# Patient Record
Sex: FEMALE | Race: WHITE | NOT HISPANIC OR LATINO | Employment: FULL TIME | URBAN - METROPOLITAN AREA
[De-identification: names, ages, dates, MRNs, and addresses within clinical notes are randomized per-mention and may not be internally consistent; named-entity substitution may affect disease eponyms.]

---

## 2017-04-11 ENCOUNTER — TRANSCRIBE ORDERS (OUTPATIENT)
Dept: ADMINISTRATIVE | Facility: HOSPITAL | Age: 57
End: 2017-04-11

## 2017-04-11 DIAGNOSIS — R31.0 GROSS HEMATURIA: Primary | ICD-10-CM

## 2018-06-19 ENCOUNTER — OFFICE VISIT (OUTPATIENT)
Dept: ORTHOPEDIC SURGERY | Facility: CLINIC | Age: 58
End: 2018-06-19

## 2018-06-19 VITALS — BODY MASS INDEX: 28.48 KG/M2 | TEMPERATURE: 98.6 F | HEIGHT: 64 IN | WEIGHT: 166.8 LBS

## 2018-06-19 DIAGNOSIS — Z96.642 HISTORY OF TOTAL LEFT HIP REPLACEMENT: Primary | ICD-10-CM

## 2018-06-19 DIAGNOSIS — Z96.642 STATUS POST LEFT HIP REPLACEMENT: ICD-10-CM

## 2018-06-19 PROCEDURE — 99203 OFFICE O/P NEW LOW 30 MIN: CPT | Performed by: ORTHOPAEDIC SURGERY

## 2018-06-19 PROCEDURE — 73502 X-RAY EXAM HIP UNI 2-3 VIEWS: CPT | Performed by: ORTHOPAEDIC SURGERY

## 2018-06-19 NOTE — PROGRESS NOTES
"Patient: Kiarra Rodriguez  YOB: 1960 58 y.o. female  Medical Record Number: [id number]    Chief Complaints:   Chief Complaint   Patient presents with   • Left Hip - Establish Care, Pain       History of Present Illness:Kiarra Rodriguez is a 58 y.o. female who presents with  Left hip pain.  She has an intermittent nerve type pain worse when bending at the waist it is mild-to-moderate in nature.  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  Dr. Peralta's revised her acetabular component with a reconstruction cage.  That was done 18 years ago.  She has done well until recently when she began to develop the symptoms.  She does have some issues with her low back as well.    Allergies:   Allergies   Allergen Reactions   • Demerol [Meperidine]    • Eggs Or Egg-Derived Products GI Intolerance   • Wheat Bran GI Intolerance       Medications:   Current Outpatient Prescriptions   Medication Sig Dispense Refill   • DIGESTIVE ENZYMES PO Take  by mouth.     • MAGNESIUM PO Take 30 mg by mouth.     • Potassium 75 MG tablet Take  by mouth.     • vitamin d (CHOLECALIFEROL) 5000 units capsule Take  by mouth.       No current facility-administered medications for this visit.          The following portions of the patient's history were reviewed and updated as appropriate: allergies, current medications, past family history, past medical history, past social history, past surgical history and problem list.    Review of Systems:   A 14 point review of systems was performed. All systems negative except pertinent positives/negative listed in HPI above    Physical Exam:   Vitals:    06/19/18 1540   Temp: 98.6 °F (37 °C)   Weight: 75.7 kg (166 lb 12.8 oz)   Height: 162.6 cm (64\")   PainSc: 0-No pain       General: A and O x 3, ASA, NAD    SCLERA:    Normal    DENTITION:   Normal  Hip:  left    LEG ALIGNMENT:     Neutral   ,    equal leg lengths    GAIT:     " Nonantalgic    SKIN:     No abnormality, well heladed incision    RANGE OF MOTION:      Full without joint irritability    STRENGTH:     5 / 5    hip flexion and abduction    DISTAL PULSES:    Paplable    DISTAL SENSATION :   Intact    LYMPHATICS:     No   lymphadenopathy    OTHER:          - Negative Stinchfeld test      - Negative log roll      - No Tenderness to palpation trochanteric bursa      - Neg FADIR      - Neg JOHNY      - No SI tenderness        Radiology:  Xrays 2views LEFT HIP (AP bilateral hips, and lateral of the hip) ordered and reviewed for evaluation of hip pain  demonstrating  a well positioned revision total hip without evidence of wear, loosening, or osteoarthritis.  There is a reconstruction cage which is cemented in.  It shows no signs of loosening I compared them to previous outside films dating back to 2006 and there has been no change    Assessment/Plan: Left hip pain which is a nerve-type pain which may be related to low back.  I see no signs of loosening or significant wear of her revision hip prosthesis.  I'd like to check her back in 6 months sooner should she have worsening pain.  We will repeat x-rays at that time.      Yefri Burger MD  6/19/2018

## 2019-01-29 ENCOUNTER — OFFICE VISIT (OUTPATIENT)
Dept: ORTHOPEDIC SURGERY | Facility: CLINIC | Age: 59
End: 2019-01-29

## 2019-01-29 VITALS — TEMPERATURE: 98.3 F | WEIGHT: 169 LBS | BODY MASS INDEX: 28.85 KG/M2 | HEIGHT: 64 IN

## 2019-01-29 DIAGNOSIS — Z96.642 STATUS POST LEFT HIP REPLACEMENT: Primary | ICD-10-CM

## 2019-01-29 PROCEDURE — 73502 X-RAY EXAM HIP UNI 2-3 VIEWS: CPT | Performed by: ORTHOPAEDIC SURGERY

## 2019-01-29 PROCEDURE — 99212 OFFICE O/P EST SF 10 MIN: CPT | Performed by: ORTHOPAEDIC SURGERY

## 2019-01-29 NOTE — PROGRESS NOTES
"Patient: Kiarra Rodriguez  YOB: 1960 58 y.o. female  Medical Record Number: [id number]    Chief Complaint:   Chief Complaint   Patient presents with   • Left Hip - Follow-up       History of Present Illness:Kiarra Rodriguez is a 58 y.o. female who presents for follow-up of  left total hip.  Surgery done nearly 19 years ago by Dr. Bustillo.  She had a complex acetabular reconstruction with cage.  She had some soreness when I last saw her about 6 months ago but that is improved quite a bit.  She feels it was nerve related to her low back.  At this point she denies any groin pain is getting around recently well things considered.    Allergies:   Allergies   Allergen Reactions   • Demerol [Meperidine]    • Eggs Or Egg-Derived Products GI Intolerance and Nausea And Vomiting   • Wheat Bran GI Intolerance       Medications:   Current Outpatient Medications   Medication Sig Dispense Refill   • DIGESTIVE ENZYMES PO Take  by mouth.     • MAGNESIUM PO Take 30 mg by mouth.     • Potassium 75 MG tablet Take  by mouth.     • vitamin d (CHOLECALIFEROL) 5000 units capsule Take  by mouth.       No current facility-administered medications for this visit.          The following portions of the patient's history were reviewed and updated as appropriate: allergies, current medications, past family history, past medical history, past social history, past surgical history and problem list.    Review of Systems:   A 14 point review of systems was performed. All systems negative except pertinent positives/negative listed in HPI above    Physical Exam:   Vitals:    01/29/19 1503   Temp: 98.3 °F (36.8 °C)   TempSrc: Temporal   Weight: 76.7 kg (169 lb)   Height: 162.6 cm (64\")       General: A and O x 3, ASA, NAD    SCLERA:    Normal    DENTITION:   Normal  Mild weakness of the left hip flexor and abductor muscles incisions well-healed she has overall good range of motion somewhat tight in flexion.  She walks with slight antalgic " gait leg lengths are equal    Radiology:    Xrays 2views left hip (AP bilateral hips and lateral hip) were ordered and reviewed for evaluation of hip pain demonstrating a well positioned total hip with acetabular cage reconstruction without evidence of wear, loosening, or osteoarthritis  Comparison views: todays xrays were compared to previous xrays and demonstrate no change    Assessment/Plan:  Left complex hip acetabular reconstruction 19 years out doing well no signs of wear or loosening or ostial lysis.  She'll return in 1 year with repeat x-rays sooner should she have change in symptoms.      Yefri Burger MD  1/29/2019

## 2022-01-28 ENCOUNTER — OFFICE VISIT (OUTPATIENT)
Dept: ORTHOPEDIC SURGERY | Facility: CLINIC | Age: 62
End: 2022-01-28

## 2022-01-28 DIAGNOSIS — R52 PAIN: Primary | ICD-10-CM

## 2022-01-28 DIAGNOSIS — M54.40 LOW BACK PAIN WITH SCIATICA, SCIATICA LATERALITY UNSPECIFIED, UNSPECIFIED BACK PAIN LATERALITY, UNSPECIFIED CHRONICITY: ICD-10-CM

## 2022-01-28 PROCEDURE — 99213 OFFICE O/P EST LOW 20 MIN: CPT | Performed by: NURSE PRACTITIONER

## 2022-01-28 PROCEDURE — 73502 X-RAY EXAM HIP UNI 2-3 VIEWS: CPT | Performed by: NURSE PRACTITIONER

## 2022-01-28 NOTE — PROGRESS NOTES
Patient: Kiarra Rodriguez  YOB: 1960 61 y.o. female  Medical Record Number: [id number]    Chief Complaints: Left hip pain    History of Present Illness:Kiarra Rodriguez is a 61 y.o. female who presents with complaints of left posterior hip and back pain.  The patient has had multiple surgeries to her left hip a complex acetabular reconstruction with cage.  She denies any groin pain.  The majority the pain is in her left lower back and she describes it as a mild to moderate occasional ache.  It is not daily.  She denies any radicular symptoms.  Denies any injury.  Denies any fever or chills.    Allergies:   Allergies   Allergen Reactions   • Demerol [Meperidine]    • Eggs Or Egg-Derived Products GI Intolerance and Nausea And Vomiting   • Wheat Bran GI Intolerance       Medications:   Current Outpatient Medications   Medication Sig Dispense Refill   • DIGESTIVE ENZYMES PO Take  by mouth.     • MAGNESIUM PO Take 30 mg by mouth.     • Potassium 75 MG tablet Take  by mouth.     • vitamin d (CHOLECALIFEROL) 5000 units capsule Take  by mouth.       No current facility-administered medications for this visit.         The following portions of the patient's history were reviewed and updated as appropriate: allergies, current medications, past family history, past medical history, past social history, past surgical history and problem list.    Review of Systems:   A 14 point review of systems was performed. All systems negative except pertinent positives/negative listed in HPI above    Physical Exam:   Temperature is afebrile, respirations 20    General: A and O x 3, ASA, NAD    SCLERA:    Normal    Skin clear no unusual lesions noted left hip patient is nontender palpation she has excellent range of motion with no instability calf is soft and nontender she does however have decreased range of motion of lower back with a positive left straight leg raise      Radiology:  Xrays 2 views of left hip ordered and  reviewed today secondary to pain and show a right total hip replacement with right acetabular reconstruction.  Compared to views are unchanged.  She does however have significant arthritic changes noted lower lumbar spine which is only able to be partially visualized on hip x-ray    Assessment/Plan: Low back pain    I think that the majority the patient's pain is actually coming from her lower back rather than her hip.  She and I discussed options and we will start with physical therapy and Tylenol as needed.  She will call me back if her symptoms do not improve we will consider doing an MRI of lumbar spine.  She will follow-up with Dr. Burger in 1 year for her hip      Velvet Briseno, APRN  1/28/2022

## 2024-02-23 ENCOUNTER — OFFICE VISIT (OUTPATIENT)
Dept: ORTHOPEDIC SURGERY | Facility: CLINIC | Age: 64
End: 2024-02-23
Payer: COMMERCIAL

## 2024-02-23 VITALS — WEIGHT: 161 LBS | TEMPERATURE: 98.9 F | BODY MASS INDEX: 27.49 KG/M2 | HEIGHT: 64 IN

## 2024-02-23 DIAGNOSIS — R52 PAIN: Primary | ICD-10-CM

## 2024-02-23 DIAGNOSIS — Z98.890 HISTORY OF HIP SURGERY: ICD-10-CM

## 2024-02-23 PROCEDURE — 99213 OFFICE O/P EST LOW 20 MIN: CPT | Performed by: NURSE PRACTITIONER

## 2024-02-23 NOTE — PROGRESS NOTES
"Patient: Kiarra Rodriguez  YOB: 1960 63 y.o. female  Medical Record Number: [id number]    Chief Complaints:   Chief Complaint   Patient presents with    Left Hip - Follow-up       History of Present Illness:Kiarra Rodriguez is a 63 y.o. female who presents for follow-up for left total hip replacement that was done by Dr. Bustillo approximately 23 years ago with a large acetabular reconstruction.  Continues to do well, denies any problems, she has occasional mild soreness not only in the hip but also lower back but denies any specific pain or problems.    Allergies:   Allergies   Allergen Reactions    Demerol [Meperidine]     Eggs Or Egg-Derived Products GI Intolerance and Nausea And Vomiting    Wheat Bran GI Intolerance       Medications:   Current Outpatient Medications   Medication Sig Dispense Refill    DIGESTIVE ENZYMES PO Take  by mouth.      MAGNESIUM PO Take 30 mg by mouth.      Potassium 75 MG tablet Take  by mouth.      vitamin d (CHOLECALIFEROL) 5000 units capsule Take  by mouth.       No current facility-administered medications for this visit.         The following portions of the patient's history were reviewed and updated as appropriate: allergies, current medications, past family history, past medical history, past social history, past surgical history and problem list.    Review of Systems:   14 point review of systems was performed. All systems negative except pertinent positives/negatives listed in HPI above    Physical Exam:   Vitals:    02/23/24 1350   Temp: 98.9 °F (37.2 °C)   TempSrc: Temporal   Weight: 73 kg (161 lb)   Height: 162.6 cm (64\")   PainSc: 0-No pain   PainLoc: Hip       General: A and O x 3, ASA, NAD    Left hip the patient is nontender palpation is X range of motion no instability calf is soft and nontender      Radiology:  Xrays  Xrays 2views left hip (AP bilateral hips and lateral hip) were ordered and reviewed for evaluation of hip pain demonstrating a well " positioned total hip with acetabular cage reconstruction without evidence of wear, loosening, or osteoarthritis  Comparison views: todays xrays were compared to previous xrays and demonstrate no change    Assessment/Plan: Status post left ALEX with extensive acetabular reconstruction    Patient and I discussed options, she will continue with regular exercise program, Tylenol as needed, she will let us know if her back pain worsens at all, we will see her back for follow-up in 1 to 2 years but she will call back in the meantime if her symptoms worsen      Velvet Briseno, APRN  2/23/2024